# Patient Record
Sex: FEMALE | Race: BLACK OR AFRICAN AMERICAN | NOT HISPANIC OR LATINO | URBAN - METROPOLITAN AREA
[De-identification: names, ages, dates, MRNs, and addresses within clinical notes are randomized per-mention and may not be internally consistent; named-entity substitution may affect disease eponyms.]

---
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
## 2024-08-02 NOTE — ED BEHAVIORAL HEALTH ASSESSMENT NOTE - NS ED BHA MSE SPEECH ARTICULATION
Fifth's disease  OTC non-drowsy children antihistamine during the day such as Zyrtec OR Allegra OR Claritin  Oral benadryl for itching as needed at night, it can make you drowsy    Rest, drink plenty of fluids. Consider Pedialyte, dilute apple juice, jello, and/or popsicles.      For sore throat, warm fluids can be helpful (apple juice, tea with honey), as as can an OTC throat spray (Chloraseptic) for age 3 and older.      Monitor temp  Children's Tylenol or Motrin/Advil can be taken as needed for fever, headache, body aches.     Acute Bronchitis  Take steroids as prescribed.   Recommend to take them in the morning and with food    Albuterol inhaler as needed for cough, shortness of breath or wheezing     Faith also presented with a right ear infection.     We will begin treatment with an oral antibiotic.  Take antibiotics as prescribed.   Take entire course of antibiotics.     Eat yogurt with live and active cultures and/or take a probiotic at least 3 hours before or after antibiotic dose.   Monitor stool for diarrhea and/or blood. If this occurs, contact primary care doctor ASAP.     Note that ear discomfort from fluid may persist for up to one month    Follow up with PCP in 3-5 days.  Proceed to ER if symptoms worsen.    If tests are performed, our office will contact you with results only if changes need to made to the care plan discussed with you at the visit. You can review your full results on [facility] Mychart.    
Normal